# Patient Record
Sex: FEMALE | Race: BLACK OR AFRICAN AMERICAN | Employment: UNEMPLOYED | URBAN - METROPOLITAN AREA
[De-identification: names, ages, dates, MRNs, and addresses within clinical notes are randomized per-mention and may not be internally consistent; named-entity substitution may affect disease eponyms.]

---

## 2021-01-07 ENCOUNTER — HOSPITAL ENCOUNTER (EMERGENCY)
Age: 46
Discharge: HOME | End: 2021-01-07
Attending: EMERGENCY MEDICINE
Payer: MEDICARE

## 2021-01-07 VITALS
RESPIRATION RATE: 20 BRPM | SYSTOLIC BLOOD PRESSURE: 139 MMHG | HEART RATE: 88 BPM | OXYGEN SATURATION: 94 % | TEMPERATURE: 98.5 F | DIASTOLIC BLOOD PRESSURE: 62 MMHG | WEIGHT: 238.5 LBS

## 2021-01-07 DIAGNOSIS — R56.9 SEIZURE-LIKE ACTIVITY (HCC): Primary | ICD-10-CM

## 2021-01-07 LAB
ALBUMIN SERPL-MCNC: 3.5 G/DL (ref 3.4–5)
ALBUMIN/GLOB SERPL: 0.7 {RATIO} (ref 0.8–1.7)
ALP SERPL-CCNC: 150 U/L (ref 45–117)
ALT SERPL-CCNC: 15 U/L (ref 13–56)
ANION GAP SERPL CALC-SCNC: 13 MMOL/L (ref 3–18)
AST SERPL-CCNC: 19 U/L (ref 10–38)
BASOPHILS # BLD: 0 K/UL (ref 0–0.1)
BASOPHILS NFR BLD: 0 % (ref 0–2)
BILIRUB SERPL-MCNC: 0.3 MG/DL (ref 0.2–1)
BUN SERPL-MCNC: 29 MG/DL (ref 7–18)
BUN/CREAT SERPL: 5 (ref 12–20)
CALCIUM SERPL-MCNC: 7.3 MG/DL (ref 8.5–10.1)
CHLORIDE SERPL-SCNC: 94 MMOL/L (ref 100–111)
CO2 SERPL-SCNC: 26 MMOL/L (ref 21–32)
CREAT SERPL-MCNC: 6.08 MG/DL (ref 0.6–1.3)
DIFFERENTIAL METHOD BLD: ABNORMAL
EOSINOPHIL # BLD: 0.2 K/UL (ref 0–0.4)
EOSINOPHIL NFR BLD: 2 % (ref 0–5)
ERYTHROCYTE [DISTWIDTH] IN BLOOD BY AUTOMATED COUNT: 15.7 % (ref 11.6–14.5)
GLOBULIN SER CALC-MCNC: 4.9 G/DL (ref 2–4)
GLUCOSE SERPL-MCNC: 176 MG/DL (ref 74–99)
HCT VFR BLD AUTO: 42.7 % (ref 35–45)
HGB BLD-MCNC: 12.9 G/DL (ref 12–16)
LYMPHOCYTES # BLD: 1.1 K/UL (ref 0.9–3.6)
LYMPHOCYTES NFR BLD: 9 % (ref 21–52)
MCH RBC QN AUTO: 26.8 PG (ref 24–34)
MCHC RBC AUTO-ENTMCNC: 30.2 G/DL (ref 31–37)
MCV RBC AUTO: 88.6 FL (ref 74–97)
MONOCYTES # BLD: 0.5 K/UL (ref 0.05–1.2)
MONOCYTES NFR BLD: 4 % (ref 3–10)
NEUTS SEG # BLD: 10.2 K/UL (ref 1.8–8)
NEUTS SEG NFR BLD: 85 % (ref 40–73)
PLATELET # BLD AUTO: 199 K/UL (ref 135–420)
PMV BLD AUTO: 11.3 FL (ref 9.2–11.8)
POTASSIUM SERPL-SCNC: 3.7 MMOL/L (ref 3.5–5.5)
PROT SERPL-MCNC: 8.4 G/DL (ref 6.4–8.2)
RBC # BLD AUTO: 4.82 M/UL (ref 4.2–5.3)
SODIUM SERPL-SCNC: 133 MMOL/L (ref 136–145)
WBC # BLD AUTO: 11.9 K/UL (ref 4.6–13.2)

## 2021-01-07 PROCEDURE — 85025 COMPLETE CBC W/AUTO DIFF WBC: CPT

## 2021-01-07 PROCEDURE — 80053 COMPREHEN METABOLIC PANEL: CPT

## 2021-01-07 PROCEDURE — 99285 EMERGENCY DEPT VISIT HI MDM: CPT

## 2021-01-07 PROCEDURE — 93005 ELECTROCARDIOGRAM TRACING: CPT

## 2021-01-07 NOTE — ED TRIAGE NOTES
Per EMS- pt had witnessed seizure for 3 minutes after completing dialysis. Pt has no known seizure hx. No fall, no tongue bite, no loss of bowels.   Pt postictal on arrival.

## 2021-01-07 NOTE — ED NOTES
Pt now answering orientation questions correctly and no longer appears postictal.  Pt resting in no distress.

## 2021-01-07 NOTE — ED PROVIDER NOTES
EMERGENCY DEPARTMENT HISTORY AND PHYSICAL EXAM    Date: 1/7/2021  Patient Name: Kristan Barone    History of Presenting Illness     Chief Complaint:   Chief Complaint   Patient presents with    Seizure     History Provided By: Patient    Additional History (Context): Kristan Barone is a 39 y.o. female with hx of ESRD/HD, HTN, diabetes, leg amputations, to ED via EMS. Pt denies any symptoms, states, she was told she had a seizure after she completed dialysis today. Denies history of seizures. no fall, no tongue bite, no loss of bowels. Pt does not produce urine. Per EMS- pt had witnessed seizure for 3 minutes after completing dialysis. Pt is new to the area, already established primary care and dialysis care but does not recall the name of her PCP or dialysis center, possibly [facility] (?)    PCP: None        Past History     Past Medical History:  Past Medical History:   Diagnosis Date    Diabetes ([address]     HTN (hypertension)        Past Surgical History:  Past Surgical History:   Procedure Laterality Date    HX ABOVE KNEE AMPUTATION Right     [place] AMPUTATE LOWER LEG AT KNEE Left        Family History:  History reviewed. No pertinent family history. Social History:  Social History     Tobacco Use    Smoking status: Never Smoker    Smokeless tobacco: Never Used   Substance Use Topics    Alcohol use: Not Currently    Drug use: Not on file       Allergies:  No Known Allergies      Review of Systems   Review of Systems   Constitutional: Negative for activity change, appetite change, chills and fever. Eyes: Negative. Respiratory: Negative for chest tightness and shortness of breath. Cardiovascular: Negative for chest pain. Gastrointestinal: Negative for abdominal pain, constipation, diarrhea, nausea and vomiting. Genitourinary:        ESRD, does not produce urine   Musculoskeletal: Negative. Neurological: Positive for seizures (per EMS).  Negative for dizziness, syncope, weakness, light-headedness and headaches. Hematological: Negative for adenopathy. Psychiatric/Behavioral: Negative for agitation. The patient is not nervous/anxious. All other systems reviewed and are negative. All Other Systems Negative  Physical Exam     Vitals:    01/07/21 1630 01/07/21 1645 01/07/21 1708 01/07/21 1730   BP:   139/62    Pulse: 93 93 90 88   Resp:       Temp:       SpO2: 95% 98% 97% 94%   Weight:         Physical Exam  Vitals signs and nursing note reviewed. Constitutional:       General: She is not in acute distress. Appearance: She is well-developed. She is obese. She is not ill-appearing, toxic-appearing or diaphoretic. HENT:      Head: Normocephalic and atraumatic. Nose: Nose normal.      Mouth/Throat:      Pharynx: Uvula midline. Neck:      Musculoskeletal: Normal range of motion and neck supple. Cardiovascular:      Rate and Rhythm: Normal rate and regular rhythm. Pulses: Normal pulses. Heart sounds: Normal heart sounds. Arteriovenous access: right arteriovenous access is present. Comments: Dialysis access in RT chest  Pulmonary:      Effort: Pulmonary effort is normal.      Breath sounds: Normal breath sounds. Abdominal:      General: Bowel sounds are normal.      Palpations: Abdomen is soft. Musculoskeletal:      Comments: LLE with BKA, with prosthesis in place  RLE with AKA     Lymphadenopathy:      Cervical: No cervical adenopathy. Skin:     General: Skin is warm and dry. Neurological:      Mental Status: She is alert and oriented to person, place, and time. Deep Tendon Reflexes: Reflexes are normal and symmetric.           Diagnostic Study Results     Labs -     EKG:     Recent Results (from the past 12 hour(s))   EKG, 12 LEAD, INITIAL    Collection Time: 01/07/21  4:24 PM   Result Value Ref Range    Ventricular Rate 96 BPM    Atrial Rate 96 BPM    P-R Interval 142 ms    QRS Duration 68 ms    Q-T Interval 392 ms    QTC Calculation (Bezet) 495 ms    Calculated P Axis 61 degrees    Calculated R Axis -50 degrees    Calculated T Axis 56 degrees    Diagnosis       Normal sinus rhythm  Low voltage QRS  Left anterior fascicular block  Cannot rule out Inferior infarct (masked by fascicular block?) , age   undetermined  Possible Anterolateral infarct , age undetermined  Abnormal ECG  No previous ECGs available     CBC WITH AUTOMATED DIFF    Collection Time: 01/07/21  4:45 PM   Result Value Ref Range    WBC 11.9 4.6 - 13.2 K/uL    RBC 4.82 4.20 - 5.30 M/uL    HGB 12.9 12.0 - 16.0 g/dL    HCT 42.7 35.0 - 45.0 %    MCV 88.6 74.0 - 97.0 FL    MCH 26.8 24.0 - 34.0 PG    MCHC 30.2 (L) 31.0 - 37.0 g/dL    RDW 15.7 (H) 11.6 - 14.5 %    PLATELET 962 546 - 930 K/uL    MPV 11.3 9.2 - 11.8 FL    NEUTROPHILS 85 (H) 40 - 73 %    LYMPHOCYTES 9 (L) 21 - 52 %    MONOCYTES 4 3 - 10 %    EOSINOPHILS 2 0 - 5 %    BASOPHILS 0 0 - 2 %    ABS. NEUTROPHILS 10.2 (H) 1.8 - 8.0 K/UL    ABS. LYMPHOCYTES 1.1 0.9 - 3.6 K/UL    ABS. MONOCYTES 0.5 0.05 - 1.2 K/UL    ABS. EOSINOPHILS 0.2 0.0 - 0.4 K/UL    ABS. BASOPHILS 0.0 0.0 - 0.1 K/UL    DF AUTOMATED     METABOLIC PANEL, COMPREHENSIVE    Collection Time: 01/07/21  4:45 PM   Result Value Ref Range    Sodium 133 (L) 136 - 145 mmol/L    Potassium 3.7 3.5 - 5.5 mmol/L    Chloride 94 (L) 100 - 111 mmol/L    CO2 26 21 - 32 mmol/L    Anion gap 13 3.0 - 18 mmol/L    Glucose 176 (H) 74 - 99 mg/dL    BUN 29 (H) 7.0 - 18 MG/DL    Creatinine 6.08 (H) 0.6 - 1.3 MG/DL    BUN/Creatinine ratio 5 (L) 12 - 20      GFR est AA 9 (L) >60 ml/min/1.73m2    GFR est non-AA 7 (L) >60 ml/min/1.73m2    Calcium 7.3 (L) 8.5 - 10.1 MG/DL    Bilirubin, total 0.3 0.2 - 1.0 MG/DL    ALT (SGPT) 15 13 - 56 U/L    AST (SGOT) 19 10 - 38 U/L    Alk.  phosphatase 150 (H) 45 - 117 U/L    Protein, total 8.4 (H) 6.4 - 8.2 g/dL    Albumin 3.5 3.4 - 5.0 g/dL    Globulin 4.9 (H) 2.0 - 4.0 g/dL    A-G Ratio 0.7 (L) 0.8 - 1.7         Radiologic Studies -   No orders to display     CT Results  (Last 48 hours)    None            Medical Decision Making   I am the first provider for this patient. I reviewed the vital signs, available nursing notes, past medical history, past surgical history, family history and social history. Vital Signs-Reviewed the patient's vital signs. Records Reviewed: Nursing Notes and no old records or CareEverywhere available    Procedures:  Procedures    Provider Notes (Medical Decision Making):     Nontoxic patient presents to ED via EMS from dialysis center for evaluation of supposed seizure activity. Patient denies any symptoms, notes that she does not recall the seizure but also cannot recall what happened in the timeframe reported by EMS as a seizure. Patient's exam is consistent with her baseline. No seizures or syncope in ED, EKG is with NSR, no STEMI, read by Ksenia Cutler DO ED attending at 2862. CBC and BMP are consistent with patient's history of ESRD, DM, her baseline kidney function is unknown. Hyperglycemia noted, pt is aware. potassium is normal.  Patient has comfortable to be discharged and follow-up with her PCP and continue with her dialysis. S/S warranting immediate return to ER discussed. MED RECONCILIATION:  No current facility-administered medications for this encounter. No current outpatient medications on file. Disposition:  home    DISCHARGE NOTE:     Pt has been reexamined. Pt remained asymptomatic in ED, no seizures or syncope, denies pain. Patient has no new complaints, changes, or physical findings. Care plan outlined and precautions discussed. Results of labs were reviewed with the patient. All medications were reviewed with the patient; will d/c home. All of pt's questions and concerns were addressed. Patient was instructed and agrees to follow up with PCP and dialysis center/nephrologist, as well as to return to the ED upon further deterioration. Patient is ready to go home.     Follow-up Information     Follow up With Specialties Details Why Contact Nicole Francis   follow up with your provider or at this referral, for recheck of current symptoms [place]iddiqu[place] [id number] [phone]    [facility] EMERGENCY DEPT Emergency Medicine  As needed, If symptoms worsen [address]  [phone]          There are no discharge medications for this patient. Diagnosis     Clinical Impression:   1. Seizure-like activity [facility])          Dictation disclaimer:  Please note that this dictation was completed with [employer]EMOPTIC, the computer voice recognition software. Quite often unanticipated grammatical, syntax, homophones, and other interpretive errors are inadvertently transcribed by the computer software. Please disregard these errors. Please excuse any errors that have escaped final proofreading.

## 2021-01-08 LAB
ATRIAL RATE: 96 BPM
CALCULATED P AXIS, ECG09: 61 DEGREES
CALCULATED R AXIS, ECG10: -50 DEGREES
CALCULATED T AXIS, ECG11: 56 DEGREES
DIAGNOSIS, 93000: NORMAL
P-R INTERVAL, ECG05: 142 MS
Q-T INTERVAL, ECG07: 392 MS
QRS DURATION, ECG06: 68 MS
QTC CALCULATION (BEZET), ECG08: 495 MS
VENTRICULAR RATE, ECG03: 96 BPM

## 2021-01-14 ENCOUNTER — HOSPITAL ENCOUNTER (EMERGENCY)
Age: 46
Discharge: HOME | End: 2021-01-14
Attending: EMERGENCY MEDICINE
Payer: MEDICARE

## 2021-01-14 VITALS
OXYGEN SATURATION: 96 % | TEMPERATURE: 97.9 F | DIASTOLIC BLOOD PRESSURE: 106 MMHG | RESPIRATION RATE: 15 BRPM | HEART RATE: 85 BPM | SYSTOLIC BLOOD PRESSURE: 152 MMHG

## 2021-01-14 DIAGNOSIS — Z99.2 ACUTE HEMODIALYSIS ENCOUNTER (HCC): ICD-10-CM

## 2021-01-14 DIAGNOSIS — R41.82 ALTERED MENTAL STATUS, UNSPECIFIED ALTERED MENTAL STATUS TYPE: Primary | ICD-10-CM

## 2021-01-14 LAB
BASOPHILS # BLD: 0 K/UL (ref 0–0.1)
BASOPHILS NFR BLD: 0 % (ref 0–2)
DIFFERENTIAL METHOD BLD: ABNORMAL
EOSINOPHIL # BLD: 0.1 K/UL (ref 0–0.4)
EOSINOPHIL NFR BLD: 1 % (ref 0–5)
ERYTHROCYTE [DISTWIDTH] IN BLOOD BY AUTOMATED COUNT: 15.6 % (ref 11.6–14.5)
HCT VFR BLD AUTO: 40.1 % (ref 35–45)
HGB BLD-MCNC: 12.6 G/DL (ref 12–16)
LYMPHOCYTES # BLD: 0.6 K/UL (ref 0.9–3.6)
LYMPHOCYTES NFR BLD: 6 % (ref 21–52)
MCH RBC QN AUTO: 27.3 PG (ref 24–34)
MCHC RBC AUTO-ENTMCNC: 31.4 G/DL (ref 31–37)
MCV RBC AUTO: 86.8 FL (ref 74–97)
MONOCYTES # BLD: 0.5 K/UL (ref 0.05–1.2)
MONOCYTES NFR BLD: 4 % (ref 3–10)
NEUTS SEG # BLD: 9.6 K/UL (ref 1.8–8)
NEUTS SEG NFR BLD: 89 % (ref 40–73)
PLATELET # BLD AUTO: 227 K/UL (ref 135–420)
PMV BLD AUTO: 10.6 FL (ref 9.2–11.8)
RBC # BLD AUTO: 4.62 M/UL (ref 4.2–5.3)
WBC # BLD AUTO: 10.7 K/UL (ref 4.6–13.2)

## 2021-01-14 PROCEDURE — 85025 COMPLETE CBC W/AUTO DIFF WBC: CPT

## 2021-01-14 PROCEDURE — 93005 ELECTROCARDIOGRAM TRACING: CPT

## 2021-01-14 PROCEDURE — 99291 CRITICAL CARE FIRST HOUR: CPT

## 2021-01-14 PROCEDURE — 99285 EMERGENCY DEPT VISIT HI MDM: CPT

## 2021-01-14 NOTE — ED NOTES
Patient was turned over to me by Dr. Alfonzo Maciel at shift change. Evidently the plan was to have the patient dialyzed here patient per her nephrologist at her baseline from prior visits. I did receive a call from the patient's nephrologist Dr. Frederic Lim, indicated that he did not want to dialyze the patient and felt that she would be suitable for dialysis tomorrow at 12:30 PM at her usual dialysis center. Recommended against doing additional labs that there would be no need to keep her here any further from his standpoint. Given patient at baseline we will go ahead and discharge the patient. Patient will do her dialysis tomorrow as scheduled. Family notified of the change in time for dialysis. Patient at her baseline and stable for discharge.     Willam Bowens, DO

## 2021-01-14 NOTE — ED TRIAGE NOTES
Pt arrives by EMS with no complaiunts. Per EMS, Pt was supposed to get dialysis today and the caretaker said pt wa smore lethargic than normal. Pt is alert to name, date of birth and place.  Pt is a double amputee and has a port in right chest.

## 2021-01-14 NOTE — PROGRESS NOTES
Patient is being evaluated for lethargy. She was alert, appropriate when I talked to her, unable to relay the reason she is at the ED. From renal perspective, no need for dialysis today. I called her unit [facility] on [address] ) and they can accommodate her tomorrow at 12:15 pm if she is discharged. D/ wDr. Rhiannon Mendez. Thanks.

## 2021-01-14 NOTE — ED PROVIDER NOTES
[facility] EMERGENCY DEPT      12:27 PM    Date: 1/14/2021  Patient Name: Liudmila Parikh    History of Presenting Illness     Chief Complaint   Patient presents with    Lethargy       39 y.o. female with noted past medical history who presents to the emergency department with slight decrease in mentation per her caregiver.     The patient was at home this morning and her caregiver noticed that she was slightly slower to respond but was actually responding appropriately. Because that she sent to the ER for evaluation treatment and so going to dialysis today.     On arrival in the ER, the patient is awake alert answers questions appropriately. She denies any pain or any complaints. She states that she does realize that a hospital knows her full name. However she has a poor historian regarding her past history. She does not know who her primary care doctor or nephrologist are. Patient denies any other associated signs or symptoms. Patient denies any other complaints. Nursing notes regarding the HPI and triage nursing notes were reviewed. Prior medical records were reviewed. Past History     Past Medical History:  Past Medical History:   Diagnosis Date    Diabetes ([address]     HTN (hypertension)        Past Surgical History:  Past Surgical History:   Procedure Laterality Date    HX ABOVE KNEE AMPUTATION Right     [place] AMPUTATE LOWER LEG AT KNEE Left        Family History:  No family history on file. Social History:  Social History     Tobacco Use    Smoking status: Never Smoker    Smokeless tobacco: Never Used   Substance Use Topics    Alcohol use: Not Currently    Drug use: Not on file       Allergies:  No Known Allergies    Patient's primary care provider (as noted in EPIC):  None    REVIEW OF SYSTEMS:  Limited secondary to altered mental status. If ROS is provided, it came from collateral sources such as family, friends, or nursing faclity where appropriate.      Visit Vitals  BP (!) 144/62 (BP 1 Location: Left arm, BP Patient Position: At rest)   Pulse 88   Temp 97.9 °F (36.6 °C)   Resp 12   SpO2 98%       PHYSICAL EXAM:    CONSTITUTIONAL:  Well developed;  well nourished. Limited secondary to altered mental status. HEAD:  Normocephalic, atraumatic. EYES:  Non-icteric sclera. Normal conjunctiva. Limited secondary to altered mental status. ENTM:  Nose:  no rhinorrhea. Throat:  no erythema or exudate, mucous membranes moist.  NECK:  No JVD. Limited secondary to altered mental status. RESPIRATORY:  Chest clear, equal breath sounds, good air movement. CARDIOVASCULAR:  Regular rate and rhythm. No murmurs, rubs, or gallops. GI:  Normal bowel sounds, abdomen soft. Limited secondary to altered mental status. BACK:  Non-tender. UPPER EXT:  Normal inspection. LOWER EXT:  No edema, no calf tenderness. Distal pulses intact. NEURO:  Limited secondary to altered mental status. SKIN:  No rashes;  Normal for age. PSYCH:  Limited secondary to altered mental status. DIFFERENTIAL DIAGNOSES/ MEDICAL DECISION MAKING:  Hypoglycemia, acute alcohol, drug, or multipharmacy intoxication, sepsis from numerous possible sources including urosepsis, pneumonia, meningitis, significant CVA, TIA, intracerebral hemorrhage, subdural hemorrhage, seizure, significant trauma, electrolyte or hormonal imbalance, other etiologies, versus a combination of the above. Diagnostic Study Results     Abnormal lab results from this emergency department encounter:  Labs Reviewed   CBC WITH AUTOMATED DIFF - Abnormal; Notable for the following components:       Result Value    RDW 15.6 (*)     NEUTROPHILS 89 (*)     LYMPHOCYTES 6 (*)     ABS. NEUTROPHILS 9.6 (*)     ABS.  LYMPHOCYTES 0.6 (*)     All other components within normal limits   METABOLIC PANEL, BASIC       Lab values for this patient within approximately the last 12 hours:  Recent Results (from the past 12 hour(s))   CBC WITH AUTOMATED DIFF    Collection Time: 01/14/21 12:05 PM   Result Value Ref Range    WBC 10.7 4.6 - 13.2 K/uL    RBC 4.62 4.20 - 5.30 M/uL    HGB 12.6 12.0 - 16.0 g/dL    HCT 40.1 35.0 - 45.0 %    MCV 86.8 74.0 - 97.0 FL    MCH 27.3 24.0 - 34.0 PG    MCHC 31.4 31.0 - 37.0 g/dL    RDW 15.6 (H) 11.6 - 14.5 %    PLATELET 263 528 - 411 K/uL    MPV 10.6 9.2 - 11.8 FL    NEUTROPHILS 89 (H) 40 - 73 %    LYMPHOCYTES 6 (L) 21 - 52 %    MONOCYTES 4 3 - 10 %    EOSINOPHILS 1 0 - 5 %    BASOPHILS 0 0 - 2 %    ABS. NEUTROPHILS 9.6 (H) 1.8 - 8.0 K/UL    ABS. LYMPHOCYTES 0.6 (L) 0.9 - 3.6 K/UL    ABS. MONOCYTES 0.5 0.05 - 1.2 K/UL    ABS. EOSINOPHILS 0.1 0.0 - 0.4 K/UL    ABS. BASOPHILS 0.0 0.0 - 0.1 K/UL    DF AUTOMATED         Radiologist and cardiologist interpretations if available at time of this note:  No results found. Medication(s) ordered for patient during this emergency visit encounter:  Medications - No data to display    Medical Decision Making     I am the first provider for this patient. I reviewed the vital signs, available nursing notes, past medical history, past surgical history, family history and social history. Vital Signs:  Reviewed the patient's vital signs. ED COURSE:    1:55 PM  On-call nephrology noted that the patient may be in the emergency department for a long time until she get dialyzed. During her time emergency department she was reevaluated numerous times for neurovascular and mental status. She did remain at the same level of mental status that she had when she arrived to the emergency department. Critical Care Note:  Altered Mental Status    Critical care minutes: 120 MINUTES. Given patients presentation to ed with noted change in mental status, numerous serial neurological examinations were performed, as well as evaluation of vital signs.           Given the patients underlying condition medical intervention(s) were needed, requiring numerous reevaluations of patient's vital signs and response to different emergency department therapies, total bedside time evaluating and/or treating the patient, not including procedures, is noted below. IMPRESSION AND MEDICAL DECISION MAKING:  Based upon the patients presentation with noted HPI and PE, along with the work up done in the emergency department, I believe that the patient is having altered mental status of uncertain etiology, although he mental status change is just lower mentation without localized neuro findings. of the altered mental status. Consult Nephrology    The on call nephrologist was called and the patient was presented for nephrology consult. I personally spoke with Dr. Kavon Hoang, in the nephrology group, about the patient's presentation and management. I subsequently placed the noted nephrologist on the treatment team.    Signout Note      Pt care transferred to Dr. Flaco De Jesus  ,ED provider. History of patient complaint(s), available diagnostic reports and current treatment plan has been discussed thoroughly. Bedside rounding on patient occured : no . Intended disposition of patient : Discharge after HD. Pending diagnostics reports and/or labs (please list): [facility] in-house hemodialysis the patient then reevaluation status post hemodialysis. If patient is well can be discharged home. Dr. Flaco De Jesus is assisting with disposition as patient status post her return from dialysis. However I like to retain this patient has my own. Coding Diagnoses     Clinical Impression:   1. Altered mental status, unspecified altered mental status type    2. Acute hemodialysis encounter [facility])        Disposition     Disposition:  Discharge    Michael Crump M.D.   [facility] Board Certified Emergency Physician    Provider Attestation:  If a scribe was utilized in generation of this patient record, I personally performed the services described in the documentation, reviewed the documentation, as recorded by the scribe in my presence, and it accurately records the patient's history of presenting illness, review of systems, patient physical examination, and procedures performed by me as the attending physician. Piter Ngo M.D.   [facility] Board Certified Emergency Physician  1/14/2021.  12:27 PM

## 2021-01-15 LAB
ATRIAL RATE: 87 BPM
CALCULATED P AXIS, ECG09: -106 DEGREES
CALCULATED R AXIS, ECG10: -51 DEGREES
CALCULATED T AXIS, ECG11: 116 DEGREES
DIAGNOSIS, 93000: NORMAL
P-R INTERVAL, ECG05: 140 MS
Q-T INTERVAL, ECG07: 410 MS
QRS DURATION, ECG06: 70 MS
QTC CALCULATION (BEZET), ECG08: 493 MS
VENTRICULAR RATE, ECG03: 87 BPM